# Patient Record
Sex: MALE | Race: WHITE | Employment: STUDENT | URBAN - METROPOLITAN AREA
[De-identification: names, ages, dates, MRNs, and addresses within clinical notes are randomized per-mention and may not be internally consistent; named-entity substitution may affect disease eponyms.]

---
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
## 2025-01-02 ENCOUNTER — MED REC SCAN ONLY (OUTPATIENT)
Dept: FAMILY MEDICINE CLINIC | Facility: CLINIC | Age: 20
End: 2025-01-02

## 2025-01-10 ENCOUNTER — OFFICE VISIT (OUTPATIENT)
Dept: FAMILY MEDICINE CLINIC | Facility: CLINIC | Age: 20
End: 2025-01-10
Payer: COMMERCIAL

## 2025-01-10 VITALS
DIASTOLIC BLOOD PRESSURE: 70 MMHG | WEIGHT: 161.5 LBS | TEMPERATURE: 97 F | HEIGHT: 69 IN | SYSTOLIC BLOOD PRESSURE: 120 MMHG | HEART RATE: 76 BPM | RESPIRATION RATE: 16 BRPM | BODY MASS INDEX: 23.92 KG/M2

## 2025-01-10 DIAGNOSIS — Z00.00 ROUTINE GENERAL MEDICAL EXAMINATION AT A HEALTH CARE FACILITY: Primary | ICD-10-CM

## 2025-01-10 PROCEDURE — 99385 PREV VISIT NEW AGE 18-39: CPT | Performed by: FAMILY MEDICINE

## 2025-01-10 RX ORDER — FLUTICASONE PROPIONATE 50 MCG
1 SPRAY, SUSPENSION (ML) NASAL DAILY
COMMUNITY

## 2025-01-13 NOTE — PROGRESS NOTES
Keven Alcantara is a 19 year old male who presents for a complete physical exam.   HPI:   Pt complains of nothing today.  Patient denies any significant medical problems.  Denies any recent illnesses or hospitalizations.  Denies any family history of prostate or colon cancer.  He is currently a student at [employer].  Vaccinations up to date.    Wt Readings from Last 6 Encounters:   01/10/25 161 lb 8 oz (73.3 kg) (63%, Z= 0.34)*   11/21/19 146 lb (66.2 kg) (90%, Z= 1.28)*   07/24/15 89 lb 8.1 oz (40.6 kg) (91%, Z= 1.36)*     * Growth percentiles are based on CDC (Boys, 2-20 Years) data.     Body mass index is 23.85 kg/m².     No results found for this or any previous visit.      Current Outpatient Medications   Medication Sig Dispense Refill    fluticasone propionate 50 MCG/ACT Nasal Suspension 1 spray by Each Nare route daily.        Allergies[1]   History reviewed. No pertinent past medical history.   History reviewed. No pertinent surgical history.   History reviewed. No pertinent family history.   Social History:  Social History     Socioeconomic History    Marital status: Single   Tobacco Use    Smoking status: Never    Smokeless tobacco: Never   Vaping Use    Vaping status: Never Used      Occ: Student - [employer]. : single. Children: none.   Exercise: minimal.  Diet: doesn't watch     REVIEW OF SYSTEMS:   GENERAL: feels well otherwise  SKIN: denies any unusual skin lesions  EYES:denies blurred vision or double vision  HEENT: denies nasal congestion, sinus pain or ST  LUNGS: denies shortness of breath with exertion, denies cough  CARDIOVASCULAR: denies chest pain on exertion or at rest, denies palpitations  GI: denies abdominal pain,denies heartburn, denies n/v/d/c/blood in stool  : denies nocturia or changes in stream  MUSCULOSKELETAL: denies back pain  NEURO: denies headaches, denies LH/dizziness/syncope  PSYCHE: denies depression or anxiety  HEMATOLOGIC: denies hx of anemia  ENDOCRINE:  denies thyroid history  ALL/ASTHMA: denies hx of allergy or asthma    EXAM:   /70   Pulse 76   Temp 97.2 °F (36.2 °C) (Temporal)   Resp 16   Ht 5' 9\" (1.753 m)   Wt 161 lb 8 oz (73.3 kg)   BMI 23.85 kg/m²   Body mass index is 23.85 kg/m².   GENERAL: well developed, well nourished,in no apparent distress  SKIN: no rashes,no suspicious lesions  HEENT: atraumatic, normocephalic,ears and throat are clear  EYES:PERRLA, EOMI, conjunctiva are clear  NECK: supple,no adenopathy,no bruits, no thyromegaly  LUNGS: clear to auscultation, no r/r/w  CARDIO: RRR without murmur  GI: good BS's,no masses, HSM or tenderness  :  two descended testes,no masses,no hernia,no penile lesions  RECTAL: good rectal tone, prostate shows no masses, stool is OB negative  MUSCULOSKELETAL: back is not tender,FROM of the back  EXTREMITIES: no cyanosis, clubbing or edema  NEURO: Oriented times three,cranial nerves are intact,motor and sensory are grossly intact; 2 + knee reflexes bilaterally  VASCULAR: 2 + dorsalis pedal pulses bilaterally    ASSESSMENT AND PLAN:   Keven Alcantara is a 19 year old male who presents for a complete physical exam.    Pt's weight is Body mass index is 23.85 kg/m²., recommended low fat diet and aerobic exercise 30 minutes three times weekly.   Health maintenance, will check:   Orders Placed This Encounter   Procedures    Comp Metabolic Panel (14) [E]    CBC W Differential W Platelet [E]    Lipid Panel [E]    TSH W Reflex To Free T4 [E]    Urinalysis, Routine [E]       Vaccinations up to date  BP controlled  The patient indicates understanding of these issues and agrees to the plan.  The patient is asked to return in 1 year pending lab results.          [1] No Known Allergies

## 2025-03-25 ENCOUNTER — PATIENT MESSAGE (OUTPATIENT)
Dept: FAMILY MEDICINE CLINIC | Facility: CLINIC | Age: 20
End: 2025-03-25

## 2025-03-29 LAB
ABSOLUTE BASOPHILS: 41 CELLS/UL (ref 0–200)
ABSOLUTE EOSINOPHILS: 153 CELLS/UL (ref 15–500)
ABSOLUTE LYMPHOCYTES: 2057 CELLS/UL (ref 850–3900)
ABSOLUTE MONOCYTES: 284 CELLS/UL (ref 200–950)
ABSOLUTE NEUTROPHILS: 1967 CELLS/UL (ref 1500–7800)
ALBUMIN/GLOBULIN RATIO: 2.1 (CALC) (ref 1–2.5)
ALBUMIN: 5 G/DL (ref 3.6–5.1)
ALKALINE PHOSPHATASE: 56 U/L (ref 46–169)
ALT: 21 U/L (ref 8–46)
APPEARANCE: CLEAR
AST: 22 U/L (ref 12–32)
BASOPHILS: 0.9 %
BILIRUBIN, TOTAL: 0.7 MG/DL (ref 0.2–1.1)
BILIRUBIN: NEGATIVE
BUN: 19 MG/DL (ref 7–20)
CALCIUM: 9.9 MG/DL (ref 8.9–10.4)
CARBON DIOXIDE: 28 MMOL/L (ref 20–32)
CHLORIDE: 103 MMOL/L (ref 98–110)
CHOL/HDLC RATIO: 2.3 (CALC)
CHOLESTEROL, TOTAL: 180 MG/DL
COLOR: YELLOW
CREATININE: 1.01 MG/DL (ref 0.6–1.24)
EGFR: 110 ML/MIN/1.73M2
EOSINOPHILS: 3.4 %
GLOBULIN: 2.4 G/DL (CALC) (ref 2.1–3.5)
GLUCOSE: 91 MG/DL (ref 65–99)
GLUCOSE: NEGATIVE
HDL CHOLESTEROL: 80 MG/DL
HEMATOCRIT: 50.2 % (ref 38.5–50)
HEMOGLOBIN: 16.9 G/DL (ref 13.2–17.1)
KETONES: NEGATIVE
LDL-CHOLESTEROL: 86 MG/DL (CALC)
LEUKOCYTE ESTERASE: NEGATIVE
LYMPHOCYTES: 45.7 %
MCH: 32.6 PG (ref 27–33)
MCHC: 33.7 G/DL (ref 32–36)
MCV: 96.9 FL (ref 80–100)
MONOCYTES: 6.3 %
MPV: 10.2 FL (ref 7.5–12.5)
NEUTROPHILS: 43.7 %
NITRITE: NEGATIVE
NON-HDL CHOLESTEROL: 100 MG/DL (CALC)
OCCULT BLOOD: NEGATIVE
PH: 5.5 (ref 5–8)
PLATELET COUNT: 247 THOUSAND/UL (ref 140–400)
POTASSIUM: 3.9 MMOL/L (ref 3.8–5.1)
PROTEIN, TOTAL: 7.4 G/DL (ref 6.3–8.2)
PROTEIN: NEGATIVE
RDW: 12.2 % (ref 11–15)
RED BLOOD CELL COUNT: 5.18 MILLION/UL (ref 4.2–5.8)
SODIUM: 140 MMOL/L (ref 135–146)
SPECIFIC GRAVITY: 1.03 (ref 1–1.03)
TRIGLYCERIDES: 57 MG/DL
TSH W/REFLEX TO FT4: 2.91 MIU/L (ref 0.5–4.3)
WHITE BLOOD CELL COUNT: 4.5 THOUSAND/UL (ref 3.8–10.8)

## (undated) NOTE — LETTER
Date & Time: 11/21/2019, 8:54 PM  Patient: Sunitha Alcantara  Encounter Provider(s):    Krunal Momin MD       To Whom It May Concern:    Funmilayo Rutherford was seen and treated in our department on 11/21/2019.  He should not participate in gym/sports unt